# Patient Record
Sex: MALE | Race: WHITE | Employment: UNEMPLOYED | URBAN - METROPOLITAN AREA
[De-identification: names, ages, dates, MRNs, and addresses within clinical notes are randomized per-mention and may not be internally consistent; named-entity substitution may affect disease eponyms.]

---

## 2018-04-06 PROBLEM — R33.9 URINARY RETENTION: Status: ACTIVE | Noted: 2018-04-06

## 2018-04-06 PROBLEM — G93.40 ACUTE ENCEPHALOPATHY: Status: ACTIVE | Noted: 2018-04-06

## 2018-04-06 PROBLEM — F03.90 DEMENTIA WITHOUT BEHAVIORAL DISTURBANCE (HCC): Chronic | Status: ACTIVE | Noted: 2018-04-06

## 2018-04-06 PROBLEM — A41.9 SEPSIS (HCC): Status: ACTIVE | Noted: 2018-04-06

## 2018-04-06 PROBLEM — E87.1 HYPONATREMIA: Status: ACTIVE | Noted: 2018-04-06

## 2018-04-07 PROBLEM — G93.40 ACUTE ENCEPHALOPATHY: Status: RESOLVED | Noted: 2018-04-06 | Resolved: 2018-04-07

## 2018-04-07 PROBLEM — E87.1 HYPONATREMIA: Status: RESOLVED | Noted: 2018-04-06 | Resolved: 2018-04-07

## 2019-05-23 ENCOUNTER — TELEPHONE (OUTPATIENT)
Dept: FAMILY MEDICINE CLINIC | Age: 84
End: 2019-05-23

## 2019-05-23 DIAGNOSIS — N30.00 ACUTE CYSTITIS WITHOUT HEMATURIA: Primary | ICD-10-CM

## 2019-05-23 RX ORDER — SULFAMETHOXAZOLE AND TRIMETHOPRIM 800; 160 MG/1; MG/1
1 TABLET ORAL 2 TIMES DAILY
Qty: 14 TABLET | Refills: 0 | Status: SHIPPED | OUTPATIENT
Start: 2019-05-23 | End: 2019-05-30

## 2019-05-24 NOTE — TELEPHONE ENCOUNTER
PATIENT WIFE CALLED BACK AND ADVISED ON THE POTASSIUM. SHE SAID PATIENT JUST SITS AROUND AND IS NOT DOING MUCH AT ALL EVERYDAY, IS THIS ANY INDICATION OF SOMETHING TO BE CONCERNED WITH?  SC

## 2019-06-01 ENCOUNTER — APPOINTMENT (OUTPATIENT)
Dept: GENERAL RADIOLOGY | Age: 84
End: 2019-06-01
Payer: MEDICARE

## 2019-06-01 ENCOUNTER — HOSPITAL ENCOUNTER (EMERGENCY)
Age: 84
Discharge: HOME | End: 2019-06-01
Attending: EMERGENCY MEDICINE
Payer: MEDICARE

## 2019-06-01 ENCOUNTER — APPOINTMENT (OUTPATIENT)
Dept: CT IMAGING | Age: 84
End: 2019-06-01
Payer: MEDICARE

## 2019-06-01 VITALS
SYSTOLIC BLOOD PRESSURE: 150 MMHG | HEART RATE: 82 BPM | BODY MASS INDEX: 24.01 KG/M2 | DIASTOLIC BLOOD PRESSURE: 69 MMHG | OXYGEN SATURATION: 97 % | RESPIRATION RATE: 10 BRPM | WEIGHT: 171.52 LBS | TEMPERATURE: 98 F | HEIGHT: 71 IN
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
## 2019-06-01 DIAGNOSIS — S51.819A SKIN TEAR OF FOREARM WITHOUT COMPLICATION, UNSPECIFIED LATERALITY, INITIAL ENCOUNTER: ICD-10-CM

## 2019-06-01 DIAGNOSIS — E87.1 HYPONATREMIA: ICD-10-CM

## 2019-06-01 DIAGNOSIS — S09.90XA CLOSED HEAD INJURY, INITIAL ENCOUNTER: Primary | ICD-10-CM

## 2019-06-01 DIAGNOSIS — Z23 NEED FOR DIPHTHERIA-TETANUS-PERTUSSIS (TDAP) VACCINE: ICD-10-CM

## 2019-06-01 LAB
A/G RATIO: 1 (ref 1.1–2.2)
ALBUMIN SERPL-MCNC: 3.7 G/DL (ref 3.4–5)
ALP BLD-CCNC: 87 U/L (ref 40–129)
ALT SERPL-CCNC: 15 U/L (ref 10–40)
ANION GAP SERPL CALCULATED.3IONS-SCNC: 13 MMOL/L (ref 3–16)
APTT: 34.4 SEC (ref 26–36)
AST SERPL-CCNC: 26 U/L (ref 15–37)
BASOPHILS ABSOLUTE: 0 K/UL (ref 0–0.2)
BASOPHILS RELATIVE PERCENT: 0.3 %
BILIRUB SERPL-MCNC: 0.5 MG/DL (ref 0–1)
BILIRUBIN URINE: NEGATIVE
BLOOD, URINE: NEGATIVE
BUN BLDV-MCNC: 13 MG/DL (ref 7–20)
CALCIUM SERPL-MCNC: 9.4 MG/DL (ref 8.3–10.6)
CHLORIDE BLD-SCNC: 94 MMOL/L (ref 99–110)
CLARITY: ABNORMAL
CO2: 22 MMOL/L (ref 21–32)
COLOR: YELLOW
COMMENT UA: ABNORMAL
CREAT SERPL-MCNC: 1.4 MG/DL (ref 0.8–1.3)
EOSINOPHILS ABSOLUTE: 0.4 K/UL (ref 0–0.6)
EOSINOPHILS RELATIVE PERCENT: 6.9 %
EPITHELIAL CELLS, UA: 4 /HPF (ref 0–5)
GFR AFRICAN AMERICAN: 58
GFR NON-AFRICAN AMERICAN: 48
GLOBULIN: 3.8 G/DL
GLUCOSE BLD-MCNC: 130 MG/DL (ref 70–99)
GLUCOSE URINE: NEGATIVE MG/DL
HCT VFR BLD CALC: 41.6 % (ref 40.5–52.5)
HEMOGLOBIN: 13.9 G/DL (ref 13.5–17.5)
HYALINE CASTS: 15 /LPF (ref 0–8)
INR BLD: 1.03 (ref 0.86–1.14)
KETONES, URINE: NEGATIVE MG/DL
LACTIC ACID: 1.7 MMOL/L (ref 0.4–2)
LEUKOCYTE ESTERASE, URINE: NEGATIVE
LIPASE: 67 U/L (ref 13–60)
LYMPHOCYTES ABSOLUTE: 0.5 K/UL (ref 1–5.1)
LYMPHOCYTES RELATIVE PERCENT: 10.3 %
MAGNESIUM: 2.1 MG/DL (ref 1.8–2.4)
MCH RBC QN AUTO: 32.5 PG (ref 26–34)
MCHC RBC AUTO-ENTMCNC: 33.4 G/DL (ref 31–36)
MCV RBC AUTO: 97.3 FL (ref 80–100)
MICROSCOPIC EXAMINATION: YES
MONOCYTES ABSOLUTE: 0.7 K/UL (ref 0–1.3)
MONOCYTES RELATIVE PERCENT: 13.8 %
NEUTROPHILS ABSOLUTE: 3.5 K/UL (ref 1.7–7.7)
NEUTROPHILS RELATIVE PERCENT: 68.7 %
NITRITE, URINE: NEGATIVE
PDW BLD-RTO: 14.1 % (ref 12.4–15.4)
PH UA: 7 (ref 5–8)
PLATELET # BLD: 198 K/UL (ref 135–450)
PMV BLD AUTO: 6.2 FL (ref 5–10.5)
POTASSIUM SERPL-SCNC: 4.2 MMOL/L (ref 3.5–5.1)
PRO-BNP: 152 PG/ML (ref 0–449)
PROTEIN UA: 100 MG/DL
PROTHROMBIN TIME: 11.7 SEC (ref 9.8–13)
RBC # BLD: 4.28 M/UL (ref 4.2–5.9)
RBC UA: 3 /HPF (ref 0–4)
SODIUM BLD-SCNC: 129 MMOL/L (ref 136–145)
SPECIFIC GRAVITY UA: 1.02 (ref 1–1.03)
TOTAL PROTEIN: 7.5 G/DL (ref 6.4–8.2)
TROPONIN: <0.01 NG/ML
URINE REFLEX TO CULTURE: ABNORMAL
URINE TYPE: ABNORMAL
UROBILINOGEN, URINE: 0.2 E.U./DL
WBC # BLD: 5.1 K/UL (ref 4–11)
WBC UA: 4 /HPF (ref 0–5)

## 2019-06-01 PROCEDURE — 93005 ELECTROCARDIOGRAM TRACING: CPT | Performed by: PHYSICIAN ASSISTANT

## 2019-06-01 PROCEDURE — 85610 PROTHROMBIN TIME: CPT

## 2019-06-01 PROCEDURE — 83690 ASSAY OF LIPASE: CPT

## 2019-06-01 PROCEDURE — 99284 EMERGENCY DEPT VISIT MOD MDM: CPT

## 2019-06-01 PROCEDURE — 96360 HYDRATION IV INFUSION INIT: CPT

## 2019-06-01 PROCEDURE — 83880 ASSAY OF NATRIURETIC PEPTIDE: CPT

## 2019-06-01 PROCEDURE — 84484 ASSAY OF TROPONIN QUANT: CPT

## 2019-06-01 PROCEDURE — 80053 COMPREHEN METABOLIC PANEL: CPT

## 2019-06-01 PROCEDURE — 71046 X-RAY EXAM CHEST 2 VIEWS: CPT

## 2019-06-01 PROCEDURE — 85025 COMPLETE CBC W/AUTO DIFF WBC: CPT

## 2019-06-01 PROCEDURE — 83605 ASSAY OF LACTIC ACID: CPT

## 2019-06-01 PROCEDURE — 2580000003 HC RX 258: Performed by: PHYSICIAN ASSISTANT

## 2019-06-01 PROCEDURE — 81001 URINALYSIS AUTO W/SCOPE: CPT

## 2019-06-01 PROCEDURE — 90471 IMMUNIZATION ADMIN: CPT | Performed by: PHYSICIAN ASSISTANT

## 2019-06-01 PROCEDURE — 85730 THROMBOPLASTIN TIME PARTIAL: CPT

## 2019-06-01 PROCEDURE — 70450 CT HEAD/BRAIN W/O DYE: CPT

## 2019-06-01 PROCEDURE — 6360000002 HC RX W HCPCS: Performed by: PHYSICIAN ASSISTANT

## 2019-06-01 PROCEDURE — 72125 CT NECK SPINE W/O DYE: CPT

## 2019-06-01 PROCEDURE — 90715 TDAP VACCINE 7 YRS/> IM: CPT | Performed by: PHYSICIAN ASSISTANT

## 2019-06-01 PROCEDURE — 87040 BLOOD CULTURE FOR BACTERIA: CPT

## 2019-06-01 PROCEDURE — 83735 ASSAY OF MAGNESIUM: CPT

## 2019-06-01 RX ORDER — 0.9 % SODIUM CHLORIDE 0.9 %
1000 INTRAVENOUS SOLUTION INTRAVENOUS ONCE
Status: COMPLETED | OUTPATIENT
Start: 2019-06-01 | End: 2019-06-01

## 2019-06-01 RX ADMIN — TETANUS TOXOID, REDUCED DIPHTHERIA TOXOID AND ACELLULAR PERTUSSIS VACCINE, ADSORBED 0.5 ML: 5; 2.5; 8; 8; 2.5 SUSPENSION INTRAMUSCULAR at 11:01

## 2019-06-01 RX ADMIN — SODIUM CHLORIDE 1000 ML: 9 INJECTION, SOLUTION INTRAVENOUS at 11:06

## 2019-06-01 NOTE — ED PROVIDER NOTES
I was available for consultation on the patient if deemed necessary by advanced practice provider. Did discuss this case with the ANDREE and agree with assessment and plan but I did not physically a face-to-face time with the patient. I did not see the patient and I am signing this chart administratively after the fact. EKG  The Ekg interpreted by me shows  Poor quality EKG was significant artifact, but does appear to be normal sinus rhythm with a rate of 73  Axis is   Normal  QTc is  normal  Intervals and Durations are unremarkable. ST Segments: normal  Delta waves, Brugada Syndrome, and Short [place] are not present. Prior EKG to compare with was available.  No significant changes compared to prior EKG from April 5, 2018       Thomas Denise MD  06/01/19 2894

## 2019-06-01 NOTE — ED NOTES
Bilateral forearm skin tears with controlled bleedingcleansed with NS, mepitel applied, wrapped with kurlex, then with coban. Pt tolerated well.      Cody Davenport RN  06/01/19 2111

## 2019-06-01 NOTE — ED PROVIDER NOTES
[address]        Pt Name: Susan Toure  MRN: [id number]  Herbgfbetsey 2/20/1931  Date of evaluation: 6/1/2019  Provider: Faiza Washington PA-C  PCP: Dennis Rowan DO    This patient was seen and evaluated by the attending physician Olga Mend COMPLAINT       Chief Complaint   Patient presents with    Fall     pt slipped and fell getting out of shower this morning hitting back of head. -blood thinner       HISTORY OF PRESENT ILLNESS   (Location/Symptom, Timing/Onset, Context/Setting, Quality, Duration, Modifying Factors, Severity)  Note limiting factors. Susan Toure is a 80 y.o. male patient presents by EMS. Patient apparently sustained a fall in the bathroom according to the wife. EMS indicates wife and bit confused. I did asked patient why he is here he states he must fallen but he does not recall why he fell. Patient is aware he has had a hospital facility. He is a poor historian at this time not aware of events that led up to his fall event. He reports no pain to me. He does have skin tears on bilateral forearms that have been cleaned and dressed by staff. The wife does show up. She has with family. She does not appear confused to me. She does clearly indicate that she was in the bathroom and  a completed sharp. States he had backed out of the shower rather than stepping out of the shower. As he backed that he apparently tripped over the towel and he fell causing injury. He does have skin tear both forearms and struck his head. He does take aspirin 81 mg. He does have mild dementia. Nursing Notes were all reviewed and agreed with or any disagreements were addressed  in the HPI. REVIEW OF SYSTEMS    (2-9 systems for level 4, 10 or more for level 5)     Review of Systems    Positives and Pertinent negatives as per HPI.   Except as noted abovein the ROS, all other systems were reviewed and negative.        PAST MEDICAL HISTORY     Past Medical History:   Diagnosis Date    Dementia     Hyperlipidemia     TYSON on CPAP     Prostate cancer ([facility] [address]     Urgency of urination          SURGICAL HISTORY     Past Surgical History:   Procedure Laterality Date    APPENDECTOMY      CHOLECYSTECTOMY      PROSTATE SURGERY           CURRENTMEDICATIONS       Discharge Medication List as of 6/1/2019  3:10 PM      CONTINUE these medications which have NOT CHANGED    Details   tamsulosin (FLOMAX) 0.4 MG capsule Take 1 capsule by mouth daily, Disp-30 capsule, R-1Print      cyanocobalamin 1000 MCG/ML injection Inject 1,000 mcg into the muscle every 30 daysHistorical Med      folic acid (FOLVITE) 1 MG tablet Take 1 mg by mouth dailyHistorical Med      Cholecalciferol (VITAMIN D3) 1000 units TABS Take 1,000 Units by mouth daily (before lunch)Historical Med      calcium carbonate (OSCAL) 500 MG TABS tablet Take 500 mg by mouth dailyHistorical Med      Memantine HCl-Donepezil HCl (NAMZARIC PO) Take 1 tablet by mouth nightlyHistorical Med               ALLERGIES     Penicillins    FAMILYHISTORY       Family History   Problem Relation Age of Onset    Arthritis Mother     Heart Disease Father     Cancer Brother           SOCIAL HISTORY       Social History     Socioeconomic History    Marital status:      Spouse name: Not on file    Number of children: Not on file    Years of education: Not on file    Highest education level: Not on file   Occupational History    Not on file   Social Needs    Financial resource strain: Not on file    Food insecurity:     Worry: Not on file     Inability: Not on file    Transportation needs:     Medical: Not on file     Non-medical: Not on file   Tobacco Use    Smoking status: Former Smoker    Smokeless tobacco: Never Used   Substance and Sexual Activity    Alcohol use: No    Drug use: No    Sexual activity: Not on file   Lifestyle    Physical activity: Days per week: Not on file     Minutes per session: Not on file    Stress: Not on file   Relationships    Social connections:     Talks on phone: Not on file     Gets together: Not on file     Attends [employer] service: Not on file     Active member of club or organization: Not on file     Attends meetings of clubs or organizations: Not on file     Relationship status: Not on file    Intimate partner violence:     Fear of current or ex partner: Not on file     Emotionally abused: Not on file     Physically abused: Not on file     Forced sexual activity: Not on file   Other Topics Concern    Not on file   Social History Narrative    Patient resides with his wife    No ambulatory assistive devices at baseline       SCREENINGS             PHYSICAL EXAM    (up to 7 for level 4, 8 or more for level 5)     ED Triage Vitals [06/01/19 0938]   BP Temp Temp Source Pulse Resp SpO2 Height Weight   128/72 98 °F (36.7 °C) Oral 77 17 100 % 5' 11\" (1.803 m) 171 lb 8.3 oz (77.8 kg)       Physical Exam   Constitutional: He is oriented to person, place, and time. He appears well-developed and well-nourished. HENT:   Head: Normocephalic and atraumatic. Right Ear: External ear normal.   Left Ear: External ear normal.   Mouth/Throat: Oropharynx is clear and moist.   No evidence of cranial injury. No evidence hemotympanum. Eyes: Pupils are equal, round, and reactive to light. Conjunctivae and EOM are normal. Right eye exhibits no discharge. Left eye exhibits no discharge. No scleral icterus. Neck: Normal range of motion. Neck supple. No thyromegaly present. I find no midline cervical spine tenderness. Cardiovascular: Normal rate, regular rhythm and normal heart sounds. Pulmonary/Chest: Effort normal.   Abdominal: Soft. Bowel sounds are normal. There is no tenderness. Musculoskeletal: Normal range of motion. Lymphadenopathy:     He has no cervical adenopathy.    Neurological: He is alert and oriented to person, place, at:  [facility]  [address] [place] [address] [address] [facility] [address]   Phone [phone]   APTT    Narrative:     Performed at:  [facility]  [address] [place] [address] [facility] [address]   Phone [phone]   PROTIME-INR    Narrative:     Performed at:  [facility]  [address] [place] [address] [address] [facility] [facility] [address]   Phone [phone]   BRAIN NATRIURETIC PEPTIDE    Narrative:     Performed at:  [facility]  [address] [place] [address] [facility] [address]   Phone [phone]   LACTIC ACID, PLASMA    Narrative:     Performed at:  [address] [facility] [address]   Phone [phone]   MAGNESIUM    Narrative:     Performed at:  [facility]  [address] [place] [address] [facility] [address]   Phone [phone]       All other labs were within normal range or not returned as of this dictation. EKG: All EKG's are interpreted by the Emergency Department Physician who either signs orCo-signs this chart in the absence of a cardiologist.  Please see their note for interpretation of EKG. RADIOLOGY:   Non-plain film images such as CT, Ultrasound and MRI are read by the radiologist. Starla Page radiographic images are visualized andpreliminarily interpreted by the  ED Provider with the below findings:    CT scan cervical spine and head showed no acute process. Chest x-ray shows no acute cardiopulmonary process. Interpretation pert Radiologist below, if available at the time of this note:    CT Cervical Spine WO Contrast   Final Result   No acute abnormality of the cervical spine. No acute intracranial abnormality. CT Head WO Contrast   Final Result   No acute abnormality of the cervical spine. No acute intracranial abnormality.          XR CHEST STANDARD (2 VW)   Final Result   No significant findings in the chest.           No results found. PROCEDURES   Unless otherwise noted below, none     Procedures    CRITICAL CARE TIME   N/A    CONSULTS:  None      EMERGENCY DEPARTMENT COURSE and DIFFERENTIALDIAGNOSIS/MDM:   Vitals:    Vitals:    06/01/19 1230 06/01/19 1300 06/01/19 1330 06/01/19 1400   BP: (!) 161/75 (!) 154/86 (!) 142/68 (!) 150/69   Pulse: 77 79 80 82   Resp: 16 14 15 10   Temp:       TempSrc:       SpO2: 100% 100% 94% 97%   Weight:       Height:           Patient was given thefollowing medications:  Medications   0.9 % sodium chloride bolus (0 mLs Intravenous Stopped 6/1/19 1420)   Tetanus-Diphth-Acell Pertussis (BOOSTRIX) injection 0.5 mL (0.5 mLs Intramuscular Given 6/1/19 1101)       Patient sustained a mechanical fall when he stumbled over a towel on the floor. He was backing out of the shower. Struck his head. Probably takes aspirin. CT scan head and neck unremarkable. Skin tears on both forearms are cleaned and dressed by staff. Tetanus shot uncertain and he is updated today. Patient was hyponatremic and was given about 500 mL saline. Chest x-ray unremarkable for acute cardiopulmonary process. Laboratory studies obtained showing cloudy urine but no evidence of UTI. The patient's WBC 5.1 and hemoglobin 13.9. The patient's CMP does show sodium 129 which is relatively consistent. The patient's GFR 48 with BUN 13 and crit and 1.4.  500 MLS saline administered. The patient troponin less than 0.01. ProBNP 152. Lactate 1.7. Coag studies normal.  80s are normal at 2.10. Patient is ambulated and did well during nursing staff. He is able to be discharged back to home with his wife. He disease walker at home. Family members are all in agreement with discharge to home. No prescriptions. The patient and family all express understanding of the diagnosis and treatment plan. I did review the case with attending physician who personally evaluated the patient.       FINAL IMPRESSION 1. Closed head injury, initial encounter    2. Fall on same level from slipping, tripping or stumbling, initial encounter    3. Skin tear of forearm without complication, unspecified laterality, initial encounter    4. Need for diphtheria-tetanus-pertussis (Tdap) vaccine    5. Hyponatremia          DISPOSITION/PLAN   DISPOSITION Decision To Discharge 06/01/2019 02:03:33 PM      PATIENT REFERREDTO:  Abbi Maxim Diasdarek [address]  [phone]    Schedule an appointment as soon as possible for a visit in 3 days      [facility] Emergency Department  [address] [address] [phone]  Go to   If symptoms worsen      DISCHARGE MEDICATIONS:  Discharge Medication List as of 6/1/2019  3:10 PM          DISCONTINUED MEDICATIONS:  Discharge Medication List as of 6/1/2019  3:10 PM                 (Please note that portions ofthis note were completed with a voice recognition program.  Efforts were made to edit the dictations but occasionally words are mis-transcribed. )    Troy Betancourt PA-C (electronically signed)           Troy Betancourt PA-C  06/01/19 1930

## 2019-06-01 NOTE — ED NOTES
Bed: B-06  Expected date: 6/1/19  Expected time: 9:19 AM  Means of arrival: [facility]  Comments:  89M fall in shower, skin tears on arms     Rubia Escobedo [facility]  06/01/19 5677

## 2019-06-02 LAB
EKG ATRIAL RATE: 75 BPM
EKG DIAGNOSIS: NORMAL
EKG P AXIS: 63 DEGREES
EKG P-R INTERVAL: 190 MS
EKG Q-T INTERVAL: 390 MS
EKG QRS DURATION: 84 MS
EKG QTC CALCULATION (BAZETT): 429 MS
EKG R AXIS: 14 DEGREES
EKG T AXIS: 34 DEGREES
EKG VENTRICULAR RATE: 73 BPM

## 2019-06-02 PROCEDURE — 93010 ELECTROCARDIOGRAM REPORT: CPT | Performed by: INTERNAL MEDICINE

## 2019-06-03 NOTE — CARE COORDINATION
Spoke with [facility] rep Brad Holliday. Patient is active with them and needed note from ED visit on 6/1. Faxed to [phone] as requested.     Electronically signed by RAFITA Robledo on 6/3/2019 at 3:22 PM

## 2019-06-06 LAB — BLOOD CULTURE, ROUTINE: NORMAL

## 2019-09-25 ENCOUNTER — NURSE ONLY (OUTPATIENT)
Dept: FAMILY MEDICINE CLINIC | Age: 84
End: 2019-09-25
Payer: MEDICARE

## 2019-09-25 DIAGNOSIS — Z23 NEED FOR INFLUENZA VACCINATION: Primary | ICD-10-CM

## 2019-09-25 PROCEDURE — G0008 ADMIN INFLUENZA VIRUS VAC: HCPCS | Performed by: FAMILY MEDICINE

## 2019-09-25 PROCEDURE — 90653 IIV ADJUVANT VACCINE IM: CPT | Performed by: FAMILY MEDICINE

## 2020-11-16 ENCOUNTER — TELEPHONE (OUTPATIENT)
Dept: FAMILY MEDICINE CLINIC | Age: 85
End: 2020-11-16

## 2020-11-16 NOTE — TELEPHONE ENCOUNTER
CALLED AND SPOKE TO PATIENT SON AND ADVISED THAT WE WILL CALL OVER TO [facility] AND GET A U/A AND CULTURE VERBALLY ORDERED TO CHECK FOR POSSIBLE UTI. CALLED AND LEFT DETAILED MESSAGE FOR TERRENCE AT [facility] GIVING HER THE VERBAL TO RUN THE U/A AND CULTURE ON URINE AND TO R/O A UTI. GAVE MY DESK NUMBER FOR HER TO CALL BACK WITH ANY QUESTIONS.  SC

## 2020-11-16 NOTE — TELEPHONE ENCOUNTER
Patient son would like to speak with someone concerning his dad at [facility], his dad may have a UTI, he fell last Tuesday 11/10/20-no injuries, fell again today getting out of bed. UTI - personaility was really change yesterday. Please give Dania Man a call back.        [facility] - Phone no. [phone]